# Patient Record
Sex: FEMALE | URBAN - METROPOLITAN AREA
[De-identification: names, ages, dates, MRNs, and addresses within clinical notes are randomized per-mention and may not be internally consistent; named-entity substitution may affect disease eponyms.]

---

## 2023-04-04 ENCOUNTER — APPOINTMENT (OUTPATIENT)
Dept: URBAN - METROPOLITAN AREA CLINIC 270 | Age: 28
Setting detail: DERMATOLOGY
End: 2023-04-04

## 2023-04-04 DIAGNOSIS — R21 RASH AND OTHER NONSPECIFIC SKIN ERUPTION: ICD-10-CM

## 2023-04-04 PROCEDURE — OTHER PRESCRIPTION MEDICATION MANAGEMENT: OTHER

## 2023-04-04 PROCEDURE — 96372 THER/PROPH/DIAG INJ SC/IM: CPT

## 2023-04-04 PROCEDURE — OTHER PRESCRIPTION: OTHER

## 2023-04-04 PROCEDURE — OTHER ADDITIONAL NOTES: OTHER

## 2023-04-04 PROCEDURE — 99202 OFFICE O/P NEW SF 15 MIN: CPT | Mod: 25

## 2023-04-04 PROCEDURE — OTHER MIPS QUALITY: OTHER

## 2023-04-04 PROCEDURE — OTHER COUNSELING: OTHER

## 2023-04-04 PROCEDURE — OTHER SEPARATE AND IDENTIFIABLE DOCUMENTATION: OTHER

## 2023-04-04 PROCEDURE — OTHER REASSURANCE: OTHER

## 2023-04-04 PROCEDURE — OTHER INJECTION: OTHER

## 2023-04-04 RX ORDER — TRIAMCINOLONE ACETONIDE 1 MG/G
CREAM TOPICAL BID
Qty: 80 | Refills: 3 | Status: ERX | COMMUNITY
Start: 2023-04-04

## 2023-04-04 ASSESSMENT — LOCATION DETAILED DESCRIPTION DERM
LOCATION DETAILED: RIGHT ANTERIOR PROXIMAL THIGH
LOCATION DETAILED: LEFT ANTERIOR PROXIMAL THIGH
LOCATION DETAILED: LEFT PROXIMAL CALF
LOCATION DETAILED: RIGHT INFERIOR LATERAL LOWER BACK
LOCATION DETAILED: RIGHT PROXIMAL PRETIBIAL REGION
LOCATION DETAILED: RIGHT DISTAL CALF
LOCATION DETAILED: LEFT PROXIMAL PRETIBIAL REGION

## 2023-04-04 ASSESSMENT — LOCATION SIMPLE DESCRIPTION DERM
LOCATION SIMPLE: LEFT CALF
LOCATION SIMPLE: LEFT THIGH
LOCATION SIMPLE: RIGHT PRETIBIAL REGION
LOCATION SIMPLE: RIGHT CALF
LOCATION SIMPLE: LEFT PRETIBIAL REGION
LOCATION SIMPLE: RIGHT LOWER BACK
LOCATION SIMPLE: RIGHT THIGH

## 2023-04-04 ASSESSMENT — LOCATION ZONE DERM
LOCATION ZONE: LEG
LOCATION ZONE: TRUNK

## 2023-04-04 NOTE — PROCEDURE: PRESCRIPTION MEDICATION MANAGEMENT
Initiate Treatment: triamcinolone acetonide 0.1 % topical cream BID\\nQuantity: 80.0 g\\nSig: Apply twice daily to rash up to 2 weeks
Detail Level: Zone
Render In Strict Bullet Format?: No

## 2023-04-04 NOTE — PROCEDURE: INJECTION
Tried calling, no answer, and mailbox is full    Procedure Information: Please note that the numeric value listed in the Medication (1) and associated J-code units and Medication (2) and associated J-code units variables are j-code amounts and do not represent either the concentration or the total amount of the medications injected.  I strongly recommend selecting no to the Render J-code information in note question. This will allow your note to be more clear. If you are billing j-codes with your injection codes you need to document the total amount of the medication injected. This amount should match the j-code units. For example, if you are injecting Triamcinolone 40mg as an intramuscular injection you would select 40 for the dose field and mg for the units. This would allow you to document  with 4 units (40mg = 10mg x 4). The total volume is not used to calculate j-codes only the amount of the medication administered.

## 2023-04-04 NOTE — PROCEDURE: ADDITIONAL NOTES
Render Risk Assessment In Note?: no
Detail Level: Simple
Additional Notes: May continue eucerin eczema relief cream, and recommended trying eczema relief body wash.\\n\\nDiscussed consideration of biopsy today but will hold at this time d/t cost.

## 2023-04-04 NOTE — HPI: RASH
What Type Of Note Output Would You Prefer (Optional)?: Standard Output
How Severe Is Your Rash?: mild
Is This A New Presentation, Or A Follow-Up?: Rash
Additional History: Mildly itchy. No rash other than to legs. No contacts with similar rash. Reports family hx of psoriasis in uncle. No personal hx of skin issues. No asthma but reports hay fever.\\nUses dove soap.  Hasn’t changed soaps detergents products etc. No recent travel, illness,or medicine changes. No otc herbal supplements. Feels well otherwise. Rash used to appear more inflamed, red and patchy and has evolved to smaller bumps and scaly spots with hypopigmented halos.

## 2023-10-02 ENCOUNTER — APPOINTMENT (OUTPATIENT)
Dept: URBAN - METROPOLITAN AREA CLINIC 303 | Age: 28
Setting detail: DERMATOLOGY
End: 2023-10-03

## 2023-10-02 DIAGNOSIS — D485 NEOPLASM OF UNCERTAIN BEHAVIOR OF SKIN: ICD-10-CM

## 2023-10-02 DIAGNOSIS — L71.8 OTHER ROSACEA: ICD-10-CM

## 2023-10-02 PROBLEM — D48.5 NEOPLASM OF UNCERTAIN BEHAVIOR OF SKIN: Status: ACTIVE | Noted: 2023-10-02

## 2023-10-02 PROCEDURE — OTHER ADDITIONAL NOTES: OTHER

## 2023-10-02 PROCEDURE — OTHER PRESCRIPTION MEDICATION MANAGEMENT: OTHER

## 2023-10-02 PROCEDURE — 99213 OFFICE O/P EST LOW 20 MIN: CPT

## 2023-10-02 PROCEDURE — OTHER PRESCRIPTION: OTHER

## 2023-10-02 PROCEDURE — OTHER COUNSELING: OTHER

## 2023-10-02 RX ORDER — AZELAIC ACID 0.15 G/G
AEROSOL, FOAM TOPICAL
Qty: 50 | Refills: 3 | Status: ERX | COMMUNITY
Start: 2023-10-02

## 2023-10-02 RX ORDER — DOXYCYCLINE 40 MG/1
CAPSULE ORAL QD
Qty: 32 | Refills: 3 | Status: ERX | COMMUNITY
Start: 2023-10-02

## 2023-10-02 ASSESSMENT — LOCATION ZONE DERM: LOCATION ZONE: FACE

## 2023-10-02 ASSESSMENT — LOCATION DETAILED DESCRIPTION DERM
LOCATION DETAILED: LEFT INFERIOR CENTRAL MALAR CHEEK
LOCATION DETAILED: RIGHT MEDIAL MALAR CHEEK
LOCATION DETAILED: RIGHT INFERIOR CENTRAL MALAR CHEEK

## 2023-10-02 ASSESSMENT — LOCATION SIMPLE DESCRIPTION DERM
LOCATION SIMPLE: RIGHT CHEEK
LOCATION SIMPLE: LEFT CHEEK

## 2023-10-02 NOTE — PROCEDURE: PRESCRIPTION MEDICATION MANAGEMENT
Initiate Treatment: Oracea Qd\\nFinacea BID\\nMirvaso qAM prn
Render In Strict Bullet Format?: No
Plan: Initially had discussed rx for Rhofade but will switch to Mirvaso rx due to fda changes
Detail Level: Zone

## 2023-10-02 NOTE — PROCEDURE: ADDITIONAL NOTES
Additional Notes: Patient states this has been present for most of her adult life and has remain unchanged for years and is asymptomatic. Discussed it appears consistent with sebaceous hyperplasia but to monitor closely and rtc prn if any changes such as growth, bleeding, darkening, etc. declined removal today
Detail Level: Simple
Render Risk Assessment In Note?: no

## 2023-10-03 RX ORDER — BRIMONIDINE TARTRATE 5 MG/G
GEL TOPICAL
Qty: 30 | Refills: 4 | Status: ERX | COMMUNITY
Start: 2023-10-03